# Patient Record
Sex: MALE | Race: WHITE | Employment: FULL TIME | URBAN - METROPOLITAN AREA
[De-identification: names, ages, dates, MRNs, and addresses within clinical notes are randomized per-mention and may not be internally consistent; named-entity substitution may affect disease eponyms.]

---

## 2017-11-06 ENCOUNTER — OFFICE VISIT (OUTPATIENT)
Dept: ORTHOPEDIC SURGERY | Age: 32
End: 2017-11-06

## 2017-11-06 VITALS — BODY MASS INDEX: 29.12 KG/M2 | WEIGHT: 215 LBS | HEIGHT: 72 IN

## 2017-11-06 DIAGNOSIS — M25.511 RIGHT SHOULDER PAIN, UNSPECIFIED CHRONICITY: Primary | ICD-10-CM

## 2017-11-06 DIAGNOSIS — M75.101 TEAR OF RIGHT ROTATOR CUFF, UNSPECIFIED TEAR EXTENT: ICD-10-CM

## 2017-11-06 PROCEDURE — 99203 OFFICE O/P NEW LOW 30 MIN: CPT | Performed by: ORTHOPAEDIC SURGERY

## 2017-11-06 PROCEDURE — 73030 X-RAY EXAM OF SHOULDER: CPT | Performed by: ORTHOPAEDIC SURGERY

## 2017-11-06 RX ORDER — MELOXICAM 15 MG/1
15 TABLET ORAL DAILY
COMMUNITY

## 2017-11-06 NOTE — PROGRESS NOTES
REFERRING PHYSICIAN: None    CHIEF COMPLAINT:  Right shoulder pain     HISTORY OF PRESENT ILLNESS:  Garry Reilly is a 64-year-old right-hand-dominant male who presents today for evaluation of his right shoulder. 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 He had an injury to his right shoulder proximal is 17 years ago while playing football. He had a blocking slight, heard a pop and had discomfort and weakness for several days. His symptoms slowly resolved and he is able to return to football. He did not have any evaluation of his right shoulder at that time. He has since noticed significant discomfort with overhead activities such as throwing a ball. More recently, he began [employer]. He has had pain about the superior aspect of her shoulder which occurs primarily with bench pressing and other overhead activities. 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    REVIEW OF SYSTEMS:  Relevant review of systems reviewed and available in the patient's chart. PHYSICAL EXAMINATION:  He is accompanied by his wife who is a nurse at the [place] [address]. Inspection of the right shoulder reveals warm, dry, intact skin. There is no adenopathy. The distal neurovascular exam is grossly intact. He has 150° of active forward elevation. He can position his hand behind his head. He has tenderness the acromioclavicular joint. He can bring his hand behind his back but has some discomfort. He has some pain with liftoff test.  He has some weakness with belly press test.  He has positive Yergason test.  He has 5 minus out of 5 forward elevation strength. He has positive Whipple test as well as some discomfort with active compression test suggestive of superior labral pathology. Examination of the contralateral shoulder reveals no atrophy or deformity. The skin is warm and dry. Range of motion is within normal limits. There is no focal tenderness with palpation.  Provocative SLAP, biceps tension, apprehension AC joint or rotator cuff tests are negative. Strength is graded 5/5 in all muscle groups. The distal neurovascular exam is grossly intact. Cervical spine: The skin is warm and dry. There is no swelling, warmth, or erythema. Range of motion is within normal limits. There is no paraspinal or spinous process tenderness. Spurling's sign is negative and did not produce shoulder pain. The distal neurovascular exam is grossly intact. X-RAYS: 4 views of the right shoulder were obtained in the office and reviewed today. There are no bony or soft tissue abnormalities. ASSESSMENT:    1. Acute right shoulder acromioclavicular joint inflammation  2. Chronic injury review of the rotator cuff or labrum    PLAN:  I had a detailed discussion with Alfonso Guardado and his wife. I recommended an MRI scan to assess the acromioclavicular joint, superior labrum and rotator cuff. He agreed with our plan. I'll reevaluate him when his MRI is complete.

## 2017-12-04 ENCOUNTER — OFFICE VISIT (OUTPATIENT)
Dept: ORTHOPEDIC SURGERY | Age: 32
End: 2017-12-04

## 2017-12-04 VITALS
DIASTOLIC BLOOD PRESSURE: 83 MMHG | WEIGHT: 214.95 LBS | HEART RATE: 67 BPM | SYSTOLIC BLOOD PRESSURE: 138 MMHG | BODY MASS INDEX: 29.11 KG/M2 | HEIGHT: 72 IN

## 2017-12-04 DIAGNOSIS — M25.511 ACUTE PAIN OF RIGHT SHOULDER: Primary | ICD-10-CM

## 2017-12-04 PROCEDURE — 99213 OFFICE O/P EST LOW 20 MIN: CPT | Performed by: ORTHOPAEDIC SURGERY

## 2017-12-04 PROCEDURE — L3670 SO ACRO/CLAV CAN WEB PRE OTS: HCPCS | Performed by: ORTHOPAEDIC SURGERY

## 2018-01-19 ENCOUNTER — TELEPHONE (OUTPATIENT)
Dept: ORTHOPEDIC SURGERY | Age: 33
End: 2018-01-19

## 2018-01-22 ENCOUNTER — TELEPHONE (OUTPATIENT)
Dept: ORTHOPEDIC SURGERY | Age: 33
End: 2018-01-22

## 2018-02-05 ENCOUNTER — OFFICE VISIT (OUTPATIENT)
Dept: ORTHOPEDIC SURGERY | Age: 33
End: 2018-02-05

## 2018-02-05 VITALS — HEIGHT: 72 IN | BODY MASS INDEX: 29.11 KG/M2 | WEIGHT: 214.95 LBS

## 2018-02-05 DIAGNOSIS — Z98.890 S/P ARTHROSCOPY: Primary | ICD-10-CM

## 2018-02-05 DIAGNOSIS — Z98.890 STATUS POST LABRAL REPAIR OF SHOULDER: ICD-10-CM

## 2018-02-05 PROCEDURE — 99024 POSTOP FOLLOW-UP VISIT: CPT | Performed by: ORTHOPAEDIC SURGERY

## 2018-02-05 NOTE — PROGRESS NOTES
HISTORY OF PRESENT ILLNESS: Amelie Fierro  returns today for the first postoperative visit after right shoulder arthroscopy, glenoid chondroplasty and arthroscopic posterior labral repair. Pain control has been satisfactory with oral medications. There have been no fevers or chills. PHYSICAL EXAMINATION: Inspection reveals expected swelling. Portal sites are clean and dry. The skin is warm. Range of motion is limited by pain and swelling. The distal neurovascular exam is grossly intact. ASSESSMENT/PLAN: Doing well after shoulder arthroscopy and posterior labral repair. I reviewed the standard postoperative exercises and provided them with a prescription for physical therapy. I have recommended ice and avoidance of NSAIDs. The sling will be worn for 4 weeks may be removed for hygiene and the prescribed exercises.   Amelie Fierro will return in approximately six weeks for repeat evaluation

## 2018-02-06 ENCOUNTER — HOSPITAL ENCOUNTER (OUTPATIENT)
Dept: PHYSICAL THERAPY | Age: 33
Discharge: HOME | End: 2018-02-28
Admitting: ORTHOPAEDIC SURGERY

## 2018-02-06 NOTE — FLOWSHEET NOTE
[address], [facility]    Physical Therapy Daily Treatment Note  Date:  2018    Patient Name:  Graciela Lance    :  1985  MRN: [id number]  Restrictions/Precautions:    Medical/Treatment Diagnosis Information:  · Diagnosis: R shoulder posterior labral tear; s/p R shouder labral repair DOS 18  · Treatment Diagnosis: R shoulder pain  Insurance/Certification information:  PT Insurance Information: [place] - $1500 deductible, $0 copay, 90/10% coinsurance, 60 PT per calendar year  Physician Information:  Referring Practitioner: Dr. Mathews [place] of care signed (Y/N): N    Date of Patient follow up with Physician:     G-Code (if applicable):      Date G-Code Applied:    PT G-Codes  Functional Assessment Tool Used: Quick Dash 11  Score: 78%  Functional Limitation: Carrying, moving and handling objects  Carrying, Moving and Handling Objects Current Status (): At least 60 percent but less than 80 percent impaired, limited or restricted  Carrying, Moving and Handling Objects Goal Status ():  At least 1 percent but less than 20 percent impaired, limited or restricted    Progress Note: [x]  Yes  []  No  Next due by: Visit #10      Latex Allergy:  [x]NO      []YES  Preferred Language for Healthcare:   [x]English       []other:    Visit # Insurance Allowable   1 60     Pain level:  4/10     SUBJECTIVE:  See eval    OBJECTIVE: See eval  Observation:   Test measurements:      RESTRICTIONS/PRECAUTIONS: Post labral precautions    Exercises/Interventions:   Therapeutic Ex Wt / Resistance Sets/sec Reps Notes   UBE       Cane AAROM flex/press       3 way Isomet       T- band Row/pinch       T- band lower pinch       T- band ER activation       Supine SA punch       SL ER       Prone Rows/HAB/ext       Seat Table slides/Wall Slides       Seated HH  Depression       No Money       Standing flex/scap       Standing Punch       HEP initiated  5'     Shoulder

## 2018-02-06 NOTE — PLAN OF CARE
[address] [place], [place] [address] [address]  Phone [phone]   Fax [phone]                                                       Physical Therapy Certification    Dear Referring Practitioner: Dr. Johny Mejia,    We had the pleasure of evaluating the following patient for physical therapy services at [address] [place] [address]. A summary of our findings can be found in the initial assessment below. This includes our plan of care. If you have any questions or concerns regarding these findings, please do not hesitate to contact me at the office phone number checked above. Thank you for the referral.       Physician Signature:_______________________________Date:__________________  By signing above (or electronic signature), therapists plan is approved by physician      Patient: Geronimo Huynh   : 1985   MRN: [id number]  Referring Physician: Referring Practitioner: Dr. Johny Mejia      Evaluation Date: 2018      Medical Diagnosis Information:  Diagnosis: R shoulder posterior labral tear; s/p R shouder labral repair DOS 18   Treatment Diagnosis: R shoulder pain                                         Insurance information: PT Insurance Information: [place] - $1500 deductible, $0 copay, 90/10% coinsurance, 60 PT per calendar year    Precautions/ Contra-indications: Posterior labral repair  Latex Allergy:  [x]NO      []YES  Preferred Language for Healthcare:   [x]English       []other:    SUBJECTIVE: Patient with R shoulder posterior labral repair and glenoid chondroplasty on 18. Says that he originally injured the shoulder during HS football but did not have issues until later on.  Says prior to surgery, had pain with lifting and trying to throw a ball    Relevant Medical History:Denies prior R shoulder issues  Functional Disability Index:PT G-Codes  Functional Assessment Tool Used: Taylor Michael 11  Score: 78%  Functional Limitation: Carrying, moving and handling objects  Carrying, Moving and Handling Objects Current Status (): At least 60 percent but less than 80 percent impaired, limited or restricted  Carrying, Moving and Handling Objects Goal Status (): At least 1 percent but less than 20 percent impaired, limited or restricted    Pain Scale: 3-4/10, 5/10 with activity  Easing factors: percocet, sling, rest  Provocative factors: moving, laying flat on back     Type: []Constant   [x]Intermittent  []Radiating []Localized []other:     Numbness/Tingling: Denies numbness / tingling in the UE    Occupation/School: Podiatrist    Living Status/Prior Level of Function: Independent with ADLs and IADLs, active in working out    OBJECTIVE:     ROM Left AROM Right PROM   Shoulder Flex 0-170 0-80   Shoulder Abd 0-170 0-80   Shoulder ER 0-90 0-30   Shoulder IR 0-70 0-40        Strength (in lbs measured with hand held dynamometer) Left Right   Shoulder Flex 39.2 NT   Shoulder Scap 33.1 NT   Shoulder ER 31.2 NT   Shoulder IR 33.9 NT     Reflexes/Sensation:    [x]Dermatomes/Myotomes intact    [x]Reflexes equal and normal bilaterally   []Other:    Joint mobility: NT due to recent surgery   []Normal    []Hypo   []Hyper    Palpation: Mild tenderness at [facility], operated by [facility] joint    Functional Mobility/Transfers: In sling, unable to bear weight through R UE    Posture: Slight forward head and rounded shoulders    Bandages/Dressings/Incisions: Well healing incisions    Gait: (include devices/WB status): WNL    Orthopedic Special Tests: NT due to recent surgery                       [x] Patient history, allergies, meds reviewed. Medical chart reviewed. See intake form. Review Of Systems (ROS):  [x]Performed Review of systems (Integumentary, CardioPulmonary, Neurological) by intake and observation. Intake form has been scanned into medical record.  Patient has been instructed to contact their primary care physician regarding ROS

## 2018-02-08 ENCOUNTER — HOSPITAL ENCOUNTER (OUTPATIENT)
Dept: PHYSICAL THERAPY | Age: 33
Discharge: HOME | End: 2018-02-09
Admitting: ORTHOPAEDIC SURGERY

## 2018-02-08 NOTE — FLOWSHEET NOTE
[facility]  NMR       Body blade       Wall ball roll       Wall Ball bounce                  Therapeutic Exercise and NMR EXR  [] (04210) Provided verbal/tactile cueing for activities related to strengthening, flexibility, endurance, ROM  for improvements in scapular, scapulothoracic and UE control with self care, reaching, carrying, lifting, house/yardwork, driving/computer work.    [] (54120) Provided verbal/tactile cueing for activities related to improving balance, coordination, kinesthetic sense, posture, motor skill, proprioception  to assist with  scapular, scapulothoracic and UE control with self care, reaching, carrying, lifting, house/yardwork, driving/computer work. Therapeutic Activities:    [] (15643 or 19788) Provided verbal/tactile cueing for activities related to improving balance, coordination, kinesthetic sense, posture, motor skill, proprioception and motor activation to allow for proper function of scapular, scapulothoracic and UE control with self care, carrying, lifting, driving/computer work.      Home Exercise Program:    [x] (91327) Reviewed/Progressed HEP activities related to strengthening, flexibility, endurance, ROM of scapular, scapulothoracic and UE control with self care, reaching, carrying, lifting, house/yardwork, driving/computer work  [] (75532) Reviewed/Progressed HEP activities related to improving balance, coordination, kinesthetic sense, posture, motor skill, proprioception of scapular, scapulothoracic and UE control with self care, reaching, carrying, lifting, house/yardwork, driving/computer work      Manual Treatments:  PROM / STM / Oscillations-Mobs:  G-I, II, III, IV (PA's, Inf., Post.)  [] (03747) Provided manual therapy to mobilize soft tissue/joints of cervical/CT, scapular GHJ and UE for the purpose of modulating pain, promoting relaxation,  increasing ROM, reducing/eliminating soft tissue swelling/inflammation/restriction, improving soft tissue extensibility and allowing for proper ROM for normal function with self care, reaching, carrying, lifting, house/yardwork, driving/computer work    Modalities:      Charges:  Timed Code Treatment Minutes: 25   Total Treatment Minutes: 35     [] EVAL  [x] AW(44320) x  1   [] IONTO  [] NMR (24169) x      [] VASO  [x] Manual (55054) x  1    [] Other:  [] TA x       [] Mech Traction (50107)  [] ES(attended) (47601)      [] ES (un) (16945):     GOALS:  Patient stated goal: Wants to get back to full activity and be able to throw a ball     Therapist goals for Patient:   Short Term Goals: To be achieved in: 2 weeks  1. Independent in HEP and progression per patient tolerance, in order to prevent re-injury. 2. Patient will have a decrease in pain to facilitate improvement in movement, function, and ADLs as indicated by Functional Deficits.     Long Term Goals: To be achieved in: 10 weeks  1. Disability index score of 20% or less for the DASH to assist with reaching prior level of function. 2. Patient will demonstrate increased AROM to have R shoulder equal to L to allow for proper joint functioning as indicated by patients Functional Deficits. 3. Patient will demonstrate an increase in Strength to have R shoulder equal to L when measured with hand held dynamometer to allow for proper functional mobility as indicated by patients Functional Deficits. 4. Patient will return to overhead functional activities without increased symptoms or restriction. Progression Towards Functional goals:  [] Patient is progressing as expected towards functional goals listed. [] Progression is slowed due to complexities listed. [] Progression has been slowed due to co-morbidities. [x] Plan just implemented, too soon to assess goals progression  [] Other:     ASSESSMENT:  Added ball squeeze, passive table slides and passive elbow flex/ext to program this date to promote beginning of motion and to limit stiffness in elbow.   Continue to progress per

## 2018-02-12 ENCOUNTER — HOSPITAL ENCOUNTER (OUTPATIENT)
Dept: PHYSICAL THERAPY | Age: 33
Discharge: HOME | End: 2018-02-13
Admitting: ORTHOPAEDIC SURGERY

## 2018-02-12 NOTE — FLOWSHEET NOTE
[address], [facility]    Physical Therapy Daily Treatment Note  Date:  2018    Patient Name:  Geronimo Huynh    :  1985  MRN: [id number]  Restrictions/Precautions:    Medical/Treatment Diagnosis Information:  · Diagnosis: R shoulder posterior labral tear; s/p R shoulder labral repair DOS 18  · Treatment Diagnosis: R shoulder pain  Insurance/Certification information:  PT Insurance Information: [place] - $1500 deductible, $0 copay, 90/10% coinsurance, 60 PT per calendar year  Physician Information:  Referring Practitioner: Dr. Caro Banner of care signed (Y/N): N    Date of Patient follow up with Physician:     G-Code (if applicable):      Date G-Code Applied:         Progress Note: [x]  Yes  []  No  Next due by: Visit #10      Latex Allergy:  [x]NO      []YES  Preferred Language for Healthcare:   [x]English       []other:    Visit # Insurance Allowable   3 60     Pain level:  2/10     SUBJECTIVE:  Pt states no new issues with shoulder.   States minimal pain noted when in sling  OBJECTIVE: 10 days s/p   Observation:   Test measurements:      RESTRICTIONS/PRECAUTIONS: Post labral precautions    Exercises/Interventions:   Therapeutic Ex 23' Wt / Resistance Sets/sec Reps Notes   UBE       Cane AAROM flex/press       3 way Isomet       T- band Row/pinch       T- band lower pinch       T- band ER activation       Supine SA punch       SL ER       Prone Rows/HAB/ext       Seat Table slides  10\" 10    Seated passive elbow flex/ext  1 30    No Money       Standing flex/scap       Ball/putty squeeze  1 30    HEP initiated  '     Shoulder iso  10\" 10    scap depression  5\" 20                   Manual  15'       Shld /GH Mobs  5'     Post Cap mobs       Thoracic/Rib manipualtion       CT MT/Mobs       PROM MT  10'                   NMR re-education       T-spine Ext       GH depres/compress       Courtney Scap Bio       [address] NMR       Body blade

## 2018-02-14 ENCOUNTER — HOSPITAL ENCOUNTER (OUTPATIENT)
Dept: PHYSICAL THERAPY | Age: 33
Discharge: HOME | End: 2018-02-15
Admitting: ORTHOPAEDIC SURGERY

## 2018-02-14 NOTE — FLOWSHEET NOTE
lifting, house/yardwork, driving/computer work    Modalities:  CP x 10'    Charges:  Timed Code Treatment Minutes: 38   Total Treatment Minutes: 48     [] EVAL  [x] HC(42834) x  2   [] IONTO  [] NMR (24631) x      [] VASO  [x] Manual (92995) x  1    [] Other:  [] TA x       [] Mech Traction (31819)  [] ES(attended) (92009)      [] ES (un) (80569):     GOALS:  Patient stated goal: Wants to get back to full activity and be able to throw a ball     Therapist goals for Patient:   Short Term Goals: To be achieved in: 2 weeks  1. Independent in HEP and progression per patient tolerance, in order to prevent re-injury. 2. Patient will have a decrease in pain to facilitate improvement in movement, function, and ADLs as indicated by Functional Deficits.     Long Term Goals: To be achieved in: 10 weeks  1. Disability index score of 20% or less for the DASH to assist with reaching prior level of function. 2. Patient will demonstrate increased AROM to have R shoulder equal to L to allow for proper joint functioning as indicated by patients Functional Deficits. 3. Patient will demonstrate an increase in Strength to have R shoulder equal to L when measured with hand held dynamometer to allow for proper functional mobility as indicated by patients Functional Deficits. 4. Patient will return to overhead functional activities without increased symptoms or restriction. Progression Towards Functional goals:  [] Patient is progressing as expected towards functional goals listed. [] Progression is slowed due to complexities listed. [] Progression has been slowed due to co-morbidities. [x] Plan just implemented, too soon to assess goals progression  [] Other:     ASSESSMENT: Good tolerance to manual therapy within protocol guidelines.   Treatment/Activity Tolerance:  [] Patient tolerated treatment well [] Patient limited by fatique  [] Patient limited by pain  [] Patient limited by other medical complications  [] Other: Prognosis: [] Good [] Fair  [] Poor    Patient Requires Follow-up: [x] Yes  [] No    PLAN:  Progress patients ROM , strength and function as tolerated by patient. Pt may transfer to [employer].   [x] Continue per plan of care [] Alter current plan (see comments)  [] Plan of care initiated [] Hold pending MD visit [] Discharge    Electronically signed by: Roxy Kemp PTA

## 2018-02-19 ENCOUNTER — HOSPITAL ENCOUNTER (OUTPATIENT)
Dept: PHYSICAL THERAPY | Age: 33
Discharge: HOME | End: 2018-02-20
Admitting: ORTHOPAEDIC SURGERY

## 2018-02-19 NOTE — FLOWSHEET NOTE
CT MT/Mobs       PROM MT  10'                   NMR re-education       T-spine Ext       GH depres/compress       Courtney Scap Bio       Scap/GH NMR       Body blade       Wall ball roll       Wall Ball bounce                  Therapeutic Exercise and NMR EXR  [] (69352) Provided verbal/tactile cueing for activities related to strengthening, flexibility, endurance, ROM  for improvements in scapular, scapulothoracic and UE control with self care, reaching, carrying, lifting, house/yardwork, driving/computer work.    [] (20505) Provided verbal/tactile cueing for activities related to improving balance, coordination, kinesthetic sense, posture, motor skill, proprioception  to assist with  scapular, scapulothoracic and UE control with self care, reaching, carrying, lifting, house/yardwork, driving/computer work. Therapeutic Activities:    [] (70408 or 33604) Provided verbal/tactile cueing for activities related to improving balance, coordination, kinesthetic sense, posture, motor skill, proprioception and motor activation to allow for proper function of scapular, scapulothoracic and UE control with self care, carrying, lifting, driving/computer work.      Home Exercise Program:    [x] (26267) Reviewed/Progressed HEP activities related to strengthening, flexibility, endurance, ROM of scapular, scapulothoracic and UE control with self care, reaching, carrying, lifting, house/yardwork, driving/computer work  [] (67195) Reviewed/Progressed HEP activities related to improving balance, coordination, kinesthetic sense, posture, motor skill, proprioception of scapular, scapulothoracic and UE control with self care, reaching, carrying, lifting, house/yardwork, driving/computer work      Manual Treatments:  PROM / STM / Oscillations-Mobs:  G-I, II, III, IV (PA's, Inf., Post.)  [] (01243) Provided manual therapy to mobilize soft tissue/joints of cervical/CT, scapular GHJ and UE for the purpose of modulating pain, promoting relaxation,  increasing ROM, reducing/eliminating soft tissue swelling/inflammation/restriction, improving soft tissue extensibility and allowing for proper ROM for normal function with self care, reaching, carrying, lifting, house/yardwork, driving/computer work    Modalities:  CP x 10'    Charges:  Timed Code Treatment Minutes: 45   Total Treatment Minutes: 60     [] EVAL  [x] XM(02297) x  2   [] IONTO  [] NMR (01110) x      [] VASO  [x] Manual (20756) x  1    [] Other:  [] TA x       [] Mech Traction (68313)  [] ES(attended) (61601)      [] ES (un) (33481):     GOALS:  Patient stated goal: Wants to get back to full activity and be able to throw a ball     Therapist goals for Patient:   Short Term Goals: To be achieved in: 2 weeks  1. Independent in HEP and progression per patient tolerance, in order to prevent re-injury. 2. Patient will have a decrease in pain to facilitate improvement in movement, function, and ADLs as indicated by Functional Deficits.     Long Term Goals: To be achieved in: 10 weeks  1. Disability index score of 20% or less for the DASH to assist with reaching prior level of function. 2. Patient will demonstrate increased AROM to have R shoulder equal to L to allow for proper joint functioning as indicated by patients Functional Deficits. 3. Patient will demonstrate an increase in Strength to have R shoulder equal to L when measured with hand held dynamometer to allow for proper functional mobility as indicated by patients Functional Deficits. 4. Patient will return to overhead functional activities without increased symptoms or restriction. Progression Towards Functional goals:  [] Patient is progressing as expected towards functional goals listed. [] Progression is slowed due to complexities listed. [] Progression has been slowed due to co-morbidities.   [x] Plan just implemented, too soon to assess goals progression  [] Other:     ASSESSMENT: Good tolerance to manual therapy

## 2018-02-22 ENCOUNTER — HOSPITAL ENCOUNTER (OUTPATIENT)
Dept: PHYSICAL THERAPY | Age: 33
Discharge: HOME | End: 2018-02-23
Admitting: ORTHOPAEDIC SURGERY

## 2018-02-22 NOTE — FLOWSHEET NOTE
increasing ROM, reducing/eliminating soft tissue swelling/inflammation/restriction, improving soft tissue extensibility and allowing for proper ROM for normal function with self care, reaching, carrying, lifting, house/yardwork, driving/computer work    Modalities:  CP x 10'    Charges:  Timed Code Treatment Minutes: 50   Total Treatment Minutes: 60     [] EVAL  [x] IQ(97404) x  2   [] IONTO  [] NMR (77533) x      [] VASO  [x] Manual (90861) x  1    [] Other:  [] TA x       [] Mech Traction (94925)  [] ES(attended) (13391)      [] ES (un) (26675):     GOALS:  Patient stated goal: Wants to get back to full activity and be able to throw a ball     Therapist goals for Patient:   Short Term Goals: To be achieved in: 2 weeks  1. Independent in HEP and progression per patient tolerance, in order to prevent re-injury. 2. Patient will have a decrease in pain to facilitate improvement in movement, function, and ADLs as indicated by Functional Deficits.     Long Term Goals: To be achieved in: 10 weeks  1. Disability index score of 20% or less for the DASH to assist with reaching prior level of function. 2. Patient will demonstrate increased AROM to have R shoulder equal to L to allow for proper joint functioning as indicated by patients Functional Deficits. 3. Patient will demonstrate an increase in Strength to have R shoulder equal to L when measured with hand held dynamometer to allow for proper functional mobility as indicated by patients Functional Deficits. 4. Patient will return to overhead functional activities without increased symptoms or restriction. Progression Towards Functional goals:  [] Patient is progressing as expected towards functional goals listed. [] Progression is slowed due to complexities listed. [] Progression has been slowed due to co-morbidities.   [x] Plan just implemented, too soon to assess goals progression  [] Other:     ASSESSMENT: Added isometrics and AAROM wand flexion to program this date. Pt performed without issues. PROM demonstrates good motion into flexion with some tightness into abduction ~90 degrees. Continue to work on improving motion and strength as tolerated per protocol to return to full function without issues. Treatment/Activity Tolerance:  [] Patient tolerated treatment well [] Patient limited by fatique  [] Patient limited by pain  [] Patient limited by other medical complications  [] Other:     Prognosis: [] Good [] Fair  [] Poor    Patient Requires Follow-up: [x] Yes  [] No    PLAN:  Progress patients ROM , strength and function as tolerated by patient. Pt may transfer to [employer].   [x] Continue per plan of care [] Alter current plan (see comments)  [] Plan of care initiated [] Hold pending MD visit [] Discharge    Electronically signed by: Eris Sahni [id number]

## 2018-02-26 ENCOUNTER — HOSPITAL ENCOUNTER (OUTPATIENT)
Dept: PHYSICAL THERAPY | Age: 33
Discharge: HOME | End: 2018-02-27
Admitting: ORTHOPAEDIC SURGERY

## 2018-02-26 NOTE — FLOWSHEET NOTE
[address], [facility]    Physical Therapy Daily Treatment Note  Date:  2018    Patient Name:  Tiffany Singh    :  1985  MRN: [id number]  Restrictions/Precautions:    Medical/Treatment Diagnosis Information:  · Diagnosis: R shoulder posterior labral tear; s/p R shoulder labral repair DOS 18  · Treatment Diagnosis: R shoulder pain  Insurance/Certification information:  PT Insurance Information: [employer] - $1500 deductible, $0 copay, 90/10% coinsurance, 60 PT per calendar year  Physician Information:  Referring Practitioner: Dr. Yolanda Bowden of care signed (Y/N): Y    Date of Patient follow up with Physician:     G-Code (if applicable):      Date G-Code Applied:         Progress Note: [x]  Yes  []  No  Next due by: Visit #10      Latex Allergy:  [x]NO      []YES  Preferred Language for Healthcare:   [x]English       []other:    Visit # Insurance Allowable   6 60     Pain level:  2/10     SUBJECTIVE:    Says that he continues to feel good overall. Feels like the motion feels better and things are coming back around. Says that he has not had any issues with getting back to work.      OBJECTIVE:   Observation:   Test measurements:      RESTRICTIONS/PRECAUTIONS: Post labral precautions    Exercises/Interventions:   Therapeutic Ex Wt / Resistance Sets/sec Reps Notes   pulleys  4'     Cane AAROM flex/press  1 10    3 way Isomet       T- band Row Black 1 30    T- band Flex / Ext Green 1 30    T- band ER / IR Red / Green 1 30    Supine SA punch 2# 1 30    Supine ABC 2# 1 2    SL abduction 1# 1 30    SL ER 1# 1 20    Prone Ext  1 20    Seat Table slides       Seated passive elbow flex/ext       No Money       Standing flex/scap       Ball/putty squeeze         '     Shoulder iso       scap depression       Ball roll on table       M'L trap rows        Manual        Shld /GH Mobs  5'     Post Cap mobs       Thoracic/Rib manipualtion       CT MT/Mobs PROM MT  10'                   NMR re-education       T-spine Ext       GH depres/compress       Courtney Scap Bio       Scap/GH NMR       Body blade       Wall ball roll       Wall Ball bounce                  Therapeutic Exercise and NMR EXR  [] (59375) Provided verbal/tactile cueing for activities related to strengthening, flexibility, endurance, ROM  for improvements in scapular, scapulothoracic and UE control with self care, reaching, carrying, lifting, house/yardwork, driving/computer work.    [] (96007) Provided verbal/tactile cueing for activities related to improving balance, coordination, kinesthetic sense, posture, motor skill, proprioception  to assist with  scapular, scapulothoracic and UE control with self care, reaching, carrying, lifting, house/yardwork, driving/computer work. Therapeutic Activities:    [] (18807 or 03800) Provided verbal/tactile cueing for activities related to improving balance, coordination, kinesthetic sense, posture, motor skill, proprioception and motor activation to allow for proper function of scapular, scapulothoracic and UE control with self care, carrying, lifting, driving/computer work.      Home Exercise Program:    [x] (90293) Reviewed/Progressed HEP activities related to strengthening, flexibility, endurance, ROM of scapular, scapulothoracic and UE control with self care, reaching, carrying, lifting, house/yardwork, driving/computer work  [] (71760) Reviewed/Progressed HEP activities related to improving balance, coordination, kinesthetic sense, posture, motor skill, proprioception of scapular, scapulothoracic and UE control with self care, reaching, carrying, lifting, house/yardwork, driving/computer work      Manual Treatments:  PROM / STM / Oscillations-Mobs:  G-I, II, III, IV (PA's, Inf., Post.)  [] (70695) Provided manual therapy to mobilize soft tissue/joints of cervical/CT, scapular GHJ and UE for the purpose of modulating pain, promoting relaxation,  increasing

## 2018-03-01 ENCOUNTER — HOSPITAL ENCOUNTER (OUTPATIENT)
Dept: PHYSICAL THERAPY | Age: 33
Discharge: HOME | End: 2018-03-31
Attending: ORTHOPAEDIC SURGERY | Admitting: ORTHOPAEDIC SURGERY

## 2018-03-01 ENCOUNTER — HOSPITAL ENCOUNTER (OUTPATIENT)
Dept: PHYSICAL THERAPY | Age: 33
Discharge: HOME | End: 2018-03-02
Admitting: ORTHOPAEDIC SURGERY

## 2018-03-01 NOTE — FLOWSHEET NOTE
Thoracic/Rib manipualtion       CT MT/Mobs       PROM MT  10'                   NMR re-education       T-spine Ext       GH depres/compress       Courtney Scap Bio       Scap/GH NMR       Body blade       Wall ball roll       Ball drops 14 oz 1 30    Water stick  Large and Small 20\" 3        Therapeutic Exercise and NMR EXR  [] (04035) Provided verbal/tactile cueing for activities related to strengthening, flexibility, endurance, ROM  for improvements in scapular, scapulothoracic and UE control with self care, reaching, carrying, lifting, house/yardwork, driving/computer work.    [] (37007) Provided verbal/tactile cueing for activities related to improving balance, coordination, kinesthetic sense, posture, motor skill, proprioception  to assist with  scapular, scapulothoracic and UE control with self care, reaching, carrying, lifting, house/yardwork, driving/computer work. Therapeutic Activities:    [] (62801 or 30142) Provided verbal/tactile cueing for activities related to improving balance, coordination, kinesthetic sense, posture, motor skill, proprioception and motor activation to allow for proper function of scapular, scapulothoracic and UE control with self care, carrying, lifting, driving/computer work.      Home Exercise Program:    [x] (74698) Reviewed/Progressed HEP activities related to strengthening, flexibility, endurance, ROM of scapular, scapulothoracic and UE control with self care, reaching, carrying, lifting, house/yardwork, driving/computer work  [] (75138) Reviewed/Progressed HEP activities related to improving balance, coordination, kinesthetic sense, posture, motor skill, proprioception of scapular, scapulothoracic and UE control with self care, reaching, carrying, lifting, house/yardwork, driving/computer work      Manual Treatments:  PROM / STM / Oscillations-Mobs:  G-I, II, III, IV (PA's, Inf., Post.)  [] (05110) Provided manual therapy to mobilize soft tissue/joints of cervical/CT,

## 2018-03-05 ENCOUNTER — HOSPITAL ENCOUNTER (OUTPATIENT)
Dept: PHYSICAL THERAPY | Age: 33
Discharge: HOME | End: 2018-03-06
Admitting: ORTHOPAEDIC SURGERY

## 2018-03-15 ENCOUNTER — HOSPITAL ENCOUNTER (OUTPATIENT)
Dept: PHYSICAL THERAPY | Age: 33
Discharge: HOME | End: 2018-03-16
Admitting: ORTHOPAEDIC SURGERY

## 2018-03-20 ENCOUNTER — OFFICE VISIT (OUTPATIENT)
Dept: ORTHOPEDIC SURGERY | Age: 33
End: 2018-03-20

## 2018-03-20 VITALS — HEIGHT: 72 IN | BODY MASS INDEX: 29.11 KG/M2 | WEIGHT: 214.95 LBS

## 2018-03-20 DIAGNOSIS — Z98.890 STATUS POST LABRAL REPAIR OF SHOULDER: Primary | ICD-10-CM

## 2018-03-20 PROCEDURE — 99024 POSTOP FOLLOW-UP VISIT: CPT | Performed by: PHYSICIAN ASSISTANT

## 2018-03-21 ENCOUNTER — HOSPITAL ENCOUNTER (OUTPATIENT)
Dept: PHYSICAL THERAPY | Age: 33
Discharge: HOME | End: 2018-03-22
Admitting: ORTHOPAEDIC SURGERY

## 2018-03-21 NOTE — FLOWSHEET NOTE
tissue/joints of cervical/CT, scapular GHJ and UE for the purpose of modulating pain, promoting relaxation,  increasing ROM, reducing/eliminating soft tissue swelling/inflammation/restriction, improving soft tissue extensibility and allowing for proper ROM for normal function with self care, reaching, carrying, lifting, house/yardwork, driving/computer work    Modalities:  CP x 10'    Charges:  Timed Code Treatment Minutes: 50   Total Treatment Minutes: 60     [] EVAL  [x] OX(08983) x  2   [] IONTO  [] NMR (41976) x      [] VASO  [x] Manual (73080) x  1    [] Other:  [] TA x       [] Mech Traction (71675)  [] ES(attended) (44456)      [] ES (un) (49745):     GOALS:  Patient stated goal: Wants to get back to full activity and be able to throw a ball     Therapist goals for Patient:   Short Term Goals: To be achieved in: 2 weeks  1. Independent in HEP and progression per patient tolerance, in order to prevent re-injury. 2. Patient will have a decrease in pain to facilitate improvement in movement, function, and ADLs as indicated by Functional Deficits.     Long Term Goals: To be achieved in: 10 weeks  1. Disability index score of 20% or less for the DASH to assist with reaching prior level of function. 2. Patient will demonstrate increased AROM to have R shoulder equal to L to allow for proper joint functioning as indicated by patients Functional Deficits. 3. Patient will demonstrate an increase in Strength to have R shoulder equal to L when measured with hand held dynamometer to allow for proper functional mobility as indicated by patients Functional Deficits. 4. Patient will return to overhead functional activities without increased symptoms or restriction. Progression Towards Functional goals:  [x] Patient is progressing as expected towards functional goals listed. [] Progression is slowed due to complexities listed. [] Progression has been slowed due to co-morbidities.   [] Plan just implemented, too soon to assess goals progression  [] Other:     ASSESSMENT: Cont to tolerate increased exercises with no issues. Good ROM. Most tightness with shoulder IR.      Treatment/Activity Tolerance:  [x] Patient tolerated treatment well [] Patient limited by fatique  [] Patient limited by pain  [] Patient limited by other medical complications  [] Other:     Prognosis: [x] Good [] Fair  [] Poor    Patient Requires Follow-up: [x] Yes  [] No    PLAN:  Cont to work strength and ROM as tolerated  [x] Continue per plan of care [] Alter current plan (see comments)  [] Plan of care initiated [] Hold pending MD visit [] Discharge    Electronically signed by: Rebecca Rios PTA

## 2018-04-01 ENCOUNTER — HOSPITAL ENCOUNTER (OUTPATIENT)
Dept: PHYSICAL THERAPY | Age: 33
Discharge: HOME | End: 2018-04-30
Attending: ORTHOPAEDIC SURGERY | Admitting: ORTHOPAEDIC SURGERY

## 2018-05-15 ENCOUNTER — OFFICE VISIT (OUTPATIENT)
Dept: ORTHOPEDIC SURGERY | Age: 33
End: 2018-05-15

## 2018-05-15 VITALS — WEIGHT: 213.85 LBS | BODY MASS INDEX: 29.94 KG/M2 | HEIGHT: 71 IN

## 2018-05-15 DIAGNOSIS — Z98.890 STATUS POST LABRAL REPAIR OF SHOULDER: Primary | ICD-10-CM

## 2018-05-15 PROCEDURE — 20611 DRAIN/INJ JOINT/BURSA W/US: CPT | Performed by: ORTHOPAEDIC SURGERY

## 2018-05-15 PROCEDURE — 99213 OFFICE O/P EST LOW 20 MIN: CPT | Performed by: ORTHOPAEDIC SURGERY
